# Patient Record
Sex: MALE | Race: WHITE | NOT HISPANIC OR LATINO | Employment: STUDENT | URBAN - METROPOLITAN AREA
[De-identification: names, ages, dates, MRNs, and addresses within clinical notes are randomized per-mention and may not be internally consistent; named-entity substitution may affect disease eponyms.]

---
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
## 2017-07-27 ENCOUNTER — OFFICE VISIT (OUTPATIENT)
Dept: PEDIATRIC ENDOCRINOLOGY | Facility: MEDICAL CENTER | Age: 22
End: 2017-07-27
Payer: COMMERCIAL

## 2017-07-27 VITALS
BODY MASS INDEX: 26.54 KG/M2 | DIASTOLIC BLOOD PRESSURE: 80 MMHG | HEIGHT: 68 IN | SYSTOLIC BLOOD PRESSURE: 120 MMHG | WEIGHT: 175.1 LBS | HEART RATE: 84 BPM

## 2017-07-27 DIAGNOSIS — R80.9 MICROALBUMINURIA: ICD-10-CM

## 2017-07-27 DIAGNOSIS — E10.9 TYPE 1 DIABETES MELLITUS WITHOUT COMPLICATION (HCC): ICD-10-CM

## 2017-07-27 DIAGNOSIS — E78.5 DYSLIPIDEMIA: ICD-10-CM

## 2017-07-27 LAB
HBA1C MFR BLD: 7.3 % (ref ?–5.8)
INT CON NEG: NEGATIVE
INT CON POS: POSITIVE

## 2017-07-27 PROCEDURE — 83036 HEMOGLOBIN GLYCOSYLATED A1C: CPT | Performed by: NURSE PRACTITIONER

## 2017-07-27 PROCEDURE — 99214 OFFICE O/P EST MOD 30 MIN: CPT | Performed by: NURSE PRACTITIONER

## 2017-07-27 RX ORDER — LISDEXAMFETAMINE DIMESYLATE CAPSULES 40 MG/1
CAPSULE ORAL
COMMUNITY

## 2017-07-27 RX ORDER — ONDANSETRON 8 MG/1
8 TABLET, ORALLY DISINTEGRATING ORAL EVERY 8 HOURS PRN
COMMUNITY

## 2017-07-27 RX ORDER — DEXMETHYLPHENIDATE HYDROCHLORIDE 5 MG/1
5 TABLET ORAL 2 TIMES DAILY
COMMUNITY

## 2017-07-27 NOTE — MR AVS SNAPSHOT
"        Hira ELIZABETH Valdes   2017 8:30 AM   Office Visit   MRN: [id number]    Department:  Peds Endocrinology   Dept Phone:  [phone]    Description:  Male : 1995   Provider:  JOSE Matute           Reason for Visit     Diabetes     Follow-Up           Allergies as of 2017     No Known Allergies      You were diagnosed with     Type 1 diabetes mellitus without complication (CMS-HCC)   [602339]       Dyslipidemia   [917469]       Microalbuminuria   [918994]         Vital Signs     Blood Pressure Pulse Height Weight Body Mass Index       120/80 mmHg 84 1.739 m (5' 8.46\") 79.425 kg (175 lb 1.6 oz) 26.26 kg/m2       Basic Information     Date Of Birth Sex Race Ethnicity Preferred Language    1995 Male White Non- English      Problem List              ICD-10-CM Priority Class Noted - Resolved    Type 1 diabetes mellitus without complication (CMS-HCC) E10.9   2017 - Present    Dyslipidemia E78.5   2017 - Present    Microalbuminuria R80.9   2017 - Present      Health Maintenance        Date Due Completion Dates    IMM HEP B VACCINE (1 of 3 - Primary Series) 1997 ---    IMM HEP A VACCINE (1 of 2 - Standard Series) 1998 ---    IMM HPV VACCINE (1 of 3 - Male 3 Dose Series) 2008 ---    IMM VARICELLA (CHICKENPOX) VACCINE (1 of 2 - 2 Dose Adolescent Series) 2010 ---    IMM MENINGOCOCCAL VACCINE (MCV4) (1 of 1) 2013 ---    IMM DTaP/Tdap/Td Vaccine (1 - Tdap) 2016 ---    IMM INFLUENZA (1) 2017 ---            Results     POCT Hemoglobin A1C      Component    Glycohemoglobin    7.3    Internal Control Negative    Negative    Internal Control Positive    Positive                        Current Immunizations     No immunizations on file.      Below and/or attached are the medications your provider expects you to take. Review all of your home medications and newly ordered medications with your provider and/or pharmacist. Follow medication " instructions as directed by your provider and/or pharmacist. Please keep your medication list with you and share with your provider. Update the information when medications are discontinued, doses are changed, or new medications (including over-the-counter products) are added; and carry medication information at all times in the event of emergency situations     Allergies:  No Known Allergies          Medications  Valid as of: July 27, 2017 -  9:25 AM    Generic Name Brand Name Tablet Size Instructions for use    Dexmethylphenidate HCl (Tab) FOCALIN 5 MG Take 5 mg by mouth 2 times a day.        Glucagon (rDNA) (Kit) Glucagon (rDNA) 1 MG by Injection route.        Glucose Blood (Strip) glucose blood  1 Each by Other route as needed.        Insulin Glargine (Solution Pen-injector) LANTUS 100 UNIT/ML Inject  as instructed as needed (back up for pump).        Insulin Glulisine (Solution Pen-injector) APIDRA 100 UNIT/ML Inject  as instructed.        Ketone Blood Test (Strip) Ketone Blood Test  Test when BS are >350 mg/dl        Lisdexamfetamine Dimesylate (Cap) Lisdexamfetamine Dimesylate 40 MG Take  by mouth.        Ondansetron (TABLET DISPERSIBLE) ZOFRAN ODT 8 MG Take 8 mg by mouth every 8 hours as needed for Nausea/Vomiting.        .                 Medicines prescribed today were sent to:     [id number] - [place] - [address]    [address] [place] [address]    Phone: [phone] Fax: [phone]    Open 24 Hours?: No      Medication refill instructions:       If your prescription bottle indicates you have medication refills left, it is not necessary to call your provider’s office. Please contact your pharmacy and they will refill your medication.    If your prescription bottle indicates you do not have any refills left, you may request refills at any time through one of the following ways: The online [employer] system (except Urgent Care), by calling your provider’s office, or by asking your pharmacy  to contact your provider’s office with a refill request. Medication refills are processed only during regular business hours and may not be available until the next business day. Your provider may request additional information or to have a follow-up visit with you prior to refilling your medication.   *Please Note: Medication refills are assigned a new Rx number when refilled electronically. Your pharmacy may indicate that no refills were authorized even though a new prescription for the same medication is available at the pharmacy. Please request the medicine by name with the pharmacy before contacting your provider for a refill.        Your To Do List     Future Labs/Procedures Complete By Expires    CBC w Differential  As directed 7/27/2018    Comprehensive Metabolic Panel  As directed 7/27/2018    IGA Quant  As directed 7/27/2018    Lipid Profile  As directed 7/27/2018    Microalbumin Creatinine Ratio Urine  As directed 7/27/2018    T-Transglutaminase IGA  As directed 7/27/2018    T4 Free  As directed 7/27/2018    TSH  As directed 7/27/2018    Vitamin D, 25 Hydroxy  As directed 7/27/2018         [employer] Access Code: [email or web address]  Expires: 8/26/2017  9:25 AM    Your email address is not on file at [facility].  Email Addresses are required for you to sign up for [employer], please contact [phone] to verify your personal information and to provide your email address prior to attempting to register for [employer].    Hira Valdes  [address] [place] [address]  [place] [address]    [employer]  A secure, online tool to manage your health information     [facility]’s [employer]® is a secure, online tool that connects you to your personalized health information from the privacy of your home -- day or night - making it very easy for you to manage your healthcare. Once the activation process is completed, you can even access your medical information using the [employer] danny, which is available for free in the Apple Danny store or  Google Play store.     To learn more about [employer], visit [email or web address]    There are two levels of access available (as shown below):   My Chart Features  [facility] Doctor [facility]  Specialists [facility]  Urgent  Care Non-[facility] Primary Care Doctor   Email your healthcare team securely and privately 24/7 X X X    Manage appointments: schedule your next appointment; view details of past/upcoming appointments X      Request prescription refills. X      View recent personal medical records, including lab and immunizations X X X X   View health record, including health history, allergies, medications X X X X   Read reports about your outpatient visits, procedures, consult and ER notes X X X X   See your discharge summary, which is a recap of your hospital and/or ER visit that includes your diagnosis, lab results, and care plan X X  X     How to register for [employer]:  Once your e-mail address has been verified, follow the following steps to sign up for [employer].     1. Go to  [email or web address]  2. Click on the Sign Up Now box, which takes you to the New Member Sign Up page. You will need to provide the following information:  a. Enter your [employer] Access Code exactly as it appears at the top of this page. (You will not need to use this code after you’ve completed the sign-up process. If you do not sign up before the expiration date, you must request a new code.)   b. Enter your date of birth.   c. Enter your home email address.   d. Click Submit, and follow the next screen’s instructions.  3. Create a [employer] ID. This will be your [employer] login ID and cannot be changed, so think of one that is secure and easy to remember.  4. Create a [employer] password. You can change your password at any time.  5. Enter your Password Reset Question and Answer. This can be used at a later time if you forget your password.   6. Enter your e-mail address. This allows you to receive e-mail notifications when new  information is available in [employer].  7. Click Sign Up. You can now view your health information.    For assistance activating your [employer] account, call [phone]

## 2017-08-13 LAB
25(OH)D3+25(OH)D2 SERPL-MCNC: 40.9 NG/ML (ref 30–100)
ALBUMIN SERPL-MCNC: 4.5 G/DL (ref 3.5–5.5)
ALBUMIN/CREAT UR: <7.7 MG/G CREAT (ref 0–30)
ALBUMIN/GLOB SERPL: 2 {RATIO} (ref 1.2–2.2)
ALP SERPL-CCNC: 90 IU/L (ref 39–117)
ALT SERPL-CCNC: 21 IU/L (ref 0–44)
AST SERPL-CCNC: 23 IU/L (ref 0–40)
BASOPHILS # BLD AUTO: 0 X10E3/UL (ref 0–0.2)
BASOPHILS NFR BLD AUTO: 0 %
BILIRUB SERPL-MCNC: 0.6 MG/DL (ref 0–1.2)
BUN SERPL-MCNC: 11 MG/DL (ref 6–20)
BUN/CREAT SERPL: 12 (ref 9–20)
CALCIUM SERPL-MCNC: 9.4 MG/DL (ref 8.7–10.2)
CHLORIDE SERPL-SCNC: 97 MMOL/L (ref 96–106)
CHOLEST SERPL-MCNC: 213 MG/DL (ref 100–199)
CO2 SERPL-SCNC: 23 MMOL/L (ref 18–29)
COMMENT 011824: ABNORMAL
CREAT SERPL-MCNC: 0.92 MG/DL (ref 0.76–1.27)
CREAT UR-MCNC: 38.8 MG/DL
EOSINOPHIL # BLD AUTO: 0.3 X10E3/UL (ref 0–0.4)
EOSINOPHIL NFR BLD AUTO: 4 %
ERYTHROCYTE [DISTWIDTH] IN BLOOD BY AUTOMATED COUNT: 14 % (ref 12.3–15.4)
GLOBULIN SER CALC-MCNC: 2.3 G/DL (ref 1.5–4.5)
GLUCOSE SERPL-MCNC: 185 MG/DL (ref 65–99)
HCT VFR BLD AUTO: 47.9 % (ref 37.5–51)
HDLC SERPL-MCNC: 51 MG/DL
HGB BLD-MCNC: 16.5 G/DL (ref 12.6–17.7)
IGA SERPL-MCNC: 130 MG/DL (ref 90–386)
IMM GRANULOCYTES # BLD: 0 X10E3/UL (ref 0–0.1)
IMM GRANULOCYTES NFR BLD: 0 %
IMMATURE CELLS  115398: NORMAL
LDLC SERPL CALC-MCNC: 135 MG/DL (ref 0–99)
LYMPHOCYTES # BLD AUTO: 2 X10E3/UL (ref 0.7–3.1)
LYMPHOCYTES NFR BLD AUTO: 25 %
MCH RBC QN AUTO: 31.8 PG (ref 26.6–33)
MCHC RBC AUTO-ENTMCNC: 34.4 G/DL (ref 31.5–35.7)
MCV RBC AUTO: 92 FL (ref 79–97)
MICROALBUMIN UR-MCNC: <3 UG/ML
MONOCYTES # BLD AUTO: 0.9 X10E3/UL (ref 0.1–0.9)
MONOCYTES NFR BLD AUTO: 11 %
MORPHOLOGY BLD-IMP: NORMAL
NEUTROPHILS # BLD AUTO: 4.7 X10E3/UL (ref 1.4–7)
NEUTROPHILS NFR BLD AUTO: 60 %
NRBC BLD AUTO-RTO: NORMAL %
PLATELET # BLD AUTO: 235 X10E3/UL (ref 150–379)
POTASSIUM SERPL-SCNC: 4.5 MMOL/L (ref 3.5–5.2)
PROT SERPL-MCNC: 6.8 G/DL (ref 6–8.5)
RBC # BLD AUTO: 5.19 X10E6/UL (ref 4.14–5.8)
SODIUM SERPL-SCNC: 137 MMOL/L (ref 134–144)
T4 FREE SERPL-MCNC: 1.19 NG/DL (ref 0.82–1.77)
TRIGL SERPL-MCNC: 137 MG/DL (ref 0–149)
TSH SERPL DL<=0.005 MIU/L-ACNC: 2.93 UIU/ML (ref 0.45–4.5)
TTG IGA SER-ACNC: <2 U/ML (ref 0–3)
VLDLC SERPL CALC-MCNC: 27 MG/DL (ref 5–40)
WBC # BLD AUTO: 7.9 X10E3/UL (ref 3.4–10.8)

## 2017-08-14 ENCOUNTER — TELEPHONE (OUTPATIENT)
Dept: PEDIATRIC ENDOCRINOLOGY | Facility: MEDICAL CENTER | Age: 22
End: 2017-08-14

## 2017-08-14 NOTE — TELEPHONE ENCOUNTER
----- Message from JOSE Matute sent at 8/14/2017  4:02 PM PDT -----  Please call patient with results.  His cholesterol is mildly elevated. Can consider fish oil supplementation (over the counter).

## 2017-11-20 ENCOUNTER — TELEPHONE (OUTPATIENT)
Dept: PEDIATRIC ENDOCRINOLOGY | Facility: MEDICAL CENTER | Age: 22
End: 2017-11-20

## 2017-11-20 DIAGNOSIS — E10.9 TYPE 1 DIABETES MELLITUS WITHOUT COMPLICATION (HCC): ICD-10-CM

## 2017-11-20 NOTE — TELEPHONE ENCOUNTER
Was the patient seen in the last year in this department? Yes     Does patient have an active prescription for medications requested? No     Received Request Via: Pharmacy       [employer] Next Test Strips  Qty: 200  Refills: 10

## 2017-12-04 ENCOUNTER — TELEPHONE (OUTPATIENT)
Dept: PEDIATRIC ENDOCRINOLOGY | Facility: MEDICAL CENTER | Age: 22
End: 2017-12-04

## 2017-12-04 DIAGNOSIS — E10.9 TYPE 1 DIABETES MELLITUS WITHOUT COMPLICATION (HCC): ICD-10-CM

## 2017-12-05 NOTE — TELEPHONE ENCOUNTER
Pt mom called asking if you can resend the rx for  -Quick set infusion sets ([id number] 32in/6mm    -reservoirs [id number]    -Assure T infusion set [id number] 32in/6mm    It needs to say that he changes it every 2 days.     Fax number to be send to is   [phone]   And  [phone]

## 2018-02-27 ENCOUNTER — TELEPHONE (OUTPATIENT)
Dept: PEDIATRIC ENDOCRINOLOGY | Facility: MEDICAL CENTER | Age: 23
End: 2018-02-27

## 2018-02-27 NOTE — TELEPHONE ENCOUNTER
Laure (mom) called and just realized they need an updated letter for the [employer] for Sushant' diabetic supplies for travel. She apologized for the late notice. They are leaving this Thursday morning.    Once written I will fax to her:  fax # [phone]

## 2018-04-24 ENCOUNTER — TELEPHONE (OUTPATIENT)
Dept: PEDIATRIC ENDOCRINOLOGY | Facility: MEDICAL CENTER | Age: 23
End: 2018-04-24

## 2018-04-24 DIAGNOSIS — E10.9 TYPE 1 DIABETES MELLITUS WITHOUT COMPLICATION (HCC): ICD-10-CM

## 2018-04-24 NOTE — TELEPHONE ENCOUNTER
He is requesting refills but does not have a follow-up appointment. Could you please call the patient or his mother and have them make a follow-up appointment. It may not be for few months as he is away at college.

## 2018-04-25 DIAGNOSIS — E10.9 TYPE 1 DIABETES MELLITUS WITHOUT COMPLICATION (HCC): ICD-10-CM

## 2018-09-13 DIAGNOSIS — E10.9 TYPE 1 DIABETES MELLITUS WITHOUT COMPLICATION (HCC): ICD-10-CM

## 2018-11-19 DIAGNOSIS — E10.9 TYPE 1 DIABETES MELLITUS WITHOUT COMPLICATION (HCC): ICD-10-CM
